# Patient Record
Sex: MALE | Race: OTHER | HISPANIC OR LATINO
[De-identification: names, ages, dates, MRNs, and addresses within clinical notes are randomized per-mention and may not be internally consistent; named-entity substitution may affect disease eponyms.]

---

## 2020-08-26 ENCOUNTER — APPOINTMENT (OUTPATIENT)
Dept: INTERNAL MEDICINE | Facility: CLINIC | Age: 45
End: 2020-08-26

## 2020-08-26 ENCOUNTER — RESULT CHARGE (OUTPATIENT)
Age: 45
End: 2020-08-26

## 2020-08-26 ENCOUNTER — OUTPATIENT (OUTPATIENT)
Dept: OUTPATIENT SERVICES | Facility: HOSPITAL | Age: 45
LOS: 1 days | End: 2020-08-26
Payer: SELF-PAY

## 2020-08-26 VITALS
BODY MASS INDEX: 32.24 KG/M2 | DIASTOLIC BLOOD PRESSURE: 70 MMHG | WEIGHT: 173 LBS | SYSTOLIC BLOOD PRESSURE: 120 MMHG | HEIGHT: 61.5 IN

## 2020-08-26 DIAGNOSIS — B35.3 TINEA PEDIS: ICD-10-CM

## 2020-08-26 DIAGNOSIS — B35.1 TINEA UNGUIUM: ICD-10-CM

## 2020-08-26 DIAGNOSIS — R53.83 OTHER FATIGUE: ICD-10-CM

## 2020-08-26 DIAGNOSIS — Z00.00 ENCOUNTER FOR GENERAL ADULT MEDICAL EXAMINATION WITHOUT ABNORMAL FINDINGS: ICD-10-CM

## 2020-08-26 DIAGNOSIS — E11.42 TYPE 2 DIABETES MELLITUS WITH DIABETIC POLYNEUROPATHY: ICD-10-CM

## 2020-08-26 DIAGNOSIS — E78.1 PURE HYPERGLYCERIDEMIA: ICD-10-CM

## 2020-08-26 DIAGNOSIS — I10 ESSENTIAL (PRIMARY) HYPERTENSION: ICD-10-CM

## 2020-08-26 DIAGNOSIS — E11.9 TYPE 2 DIABETES MELLITUS WITHOUT COMPLICATIONS: ICD-10-CM

## 2020-08-26 DIAGNOSIS — G60.9 HEREDITARY AND IDIOPATHIC NEUROPATHY, UNSPECIFIED: ICD-10-CM

## 2020-08-26 LAB
ALBUMIN: 10
CREATININE: 50
HBA1C MFR BLD HPLC: 7.2
MICROALBUMIN/CREAT UR TEST STR-RTO: NORMAL

## 2020-08-26 PROCEDURE — G0463: CPT

## 2020-08-27 LAB
ALBUMIN SERPL ELPH-MCNC: 4.5 G/DL
ALP BLD-CCNC: 102 U/L
ALT SERPL-CCNC: 16 U/L
ANION GAP SERPL CALC-SCNC: 11 MMOL/L
AST SERPL-CCNC: 18 U/L
BASOPHILS # BLD AUTO: 0.06 K/UL
BASOPHILS NFR BLD AUTO: 0.7 %
BILIRUB SERPL-MCNC: 0.7 MG/DL
BUN SERPL-MCNC: 17 MG/DL
CALCIUM SERPL-MCNC: 9.3 MG/DL
CHLORIDE SERPL-SCNC: 103 MMOL/L
CHOLEST SERPL-MCNC: 113 MG/DL
CHOLEST/HDLC SERPL: 2.7 RATIO
CO2 SERPL-SCNC: 24 MMOL/L
CREAT SERPL-MCNC: 0.5 MG/DL
EOSINOPHIL # BLD AUTO: 0.19 K/UL
EOSINOPHIL NFR BLD AUTO: 2.1 %
ESTIMATED AVERAGE GLUCOSE: 154 MG/DL
FRUCTOSAMINE SERPL-MCNC: 341 UMOL/L
GLUCOSE SERPL-MCNC: 148 MG/DL
HBA1C MFR BLD HPLC: 7 %
HCT VFR BLD CALC: 43.7 %
HDLC SERPL-MCNC: 42 MG/DL
HGB BLD-MCNC: 15.3 G/DL
IMM GRANULOCYTES NFR BLD AUTO: 0.6 %
LDLC SERPL CALC-MCNC: 43 MG/DL
LYMPHOCYTES # BLD AUTO: 1.96 K/UL
LYMPHOCYTES NFR BLD AUTO: 21.9 %
MAN DIFF?: NORMAL
MCHC RBC-ENTMCNC: 31.9 PG
MCHC RBC-ENTMCNC: 35 GM/DL
MCV RBC AUTO: 91 FL
MONOCYTES # BLD AUTO: 0.63 K/UL
MONOCYTES NFR BLD AUTO: 7.1 %
NEUTROPHILS # BLD AUTO: 6.04 K/UL
NEUTROPHILS NFR BLD AUTO: 67.6 %
PLATELET # BLD AUTO: 195 K/UL
POTASSIUM SERPL-SCNC: 4.2 MMOL/L
PROT SERPL-MCNC: 7.3 G/DL
RBC # BLD: 4.8 M/UL
RBC # FLD: 13.2 %
SODIUM SERPL-SCNC: 138 MMOL/L
TRIGL SERPL-MCNC: 139 MG/DL
TSH SERPL-ACNC: 1.1 UIU/ML
VIT B12 SERPL-MCNC: 378 PG/ML
WBC # FLD AUTO: 8.93 K/UL

## 2020-08-27 NOTE — ASSESSMENT
[FreeTextEntry1] : 45M PMH T2DM, hypertriglyceride-induced pancreatitis, presenting for CPE; with symptoms of fatigue, weight gain, and peripheral neuropathy concerning for worsening of untreated T2DM, also with onychomycosis and tinea pedis.\par \par

## 2020-08-27 NOTE — HISTORY OF PRESENT ILLNESS
[FreeTextEntry1] : CPE [de-identified] : 45M PMH T2DM, hypertriglyceride-induced pancreatitis, presenting for CPE.\par \par #Peripheral neuropathy\par - patient reports for past 2 months he has been experiencing burning/tingling and numbness in his legs bilaterally and numbness in his fingertips bilaterally\par - he denies weakness in his lower extremities\par - has never experienced this in the past \par \par #Fatigue\par - patient endorses for past 3 months he has felt excessive fatigue\par - denies chest pain or dyspnea on exertion\par - associated with weight gain ~50 lbs in last 1-2 years, exacerbated by COVID pandemic\par \par #T2DM\par - patient was diagnosed with T2DM when he presented in 2016 to ED feeling unwell, found to have TGs ~ 3700 and found with a1c 11.9\par - was treated in MICU at that time on insulin drip; started on gemfibrozil + statin and d/c'd on humulin BID\par - was steadily taken off of insulin after discharged and transitioned to metformin\par - since last f/u in 2016 he has been off metformin, gemfibrozil, statin. Currently is not taking any medications\par - reports he has gained ~ 50 lb in last 1-2 yrs. Reports his weight was down as low as 120 lbs but then gained weight again in last 1-2 years, but still significantly improved from 2016 when he weighed >200 lbs.\par - has continued to limit carbohydrate intake IE smaller portions of rice. Eats vegetables, chicken, fruits. Only drinks water. Exercises when he can\par - does not check fingersticks at home as he is "afraid." \par - most recent repeat a1c was 5.6 in 2016\par \par #Hypertriglyceride pancreatitis\par - resolved s/p admission for hypertriglyceride pancreatitis, see above\par - repeat TG in 2016 was 137\par - no longer on statin or fibrate

## 2020-08-27 NOTE — REVIEW OF SYSTEMS
[Fever] : no fever [Chills] : no chills [Pain] : no pain [Vision Problems] : no vision problems [Earache] : no earache [Chest Pain] : no chest pain [Lower Ext Edema] : no lower extremity edema [Cough] : no cough [Shortness Of Breath] : no shortness of breath [Dyspnea on Exertion] : not dyspnea on exertion [Abdominal Pain] : no abdominal pain [Constipation] : no constipation [Diarrhea] : no diarrhea [Vomiting] : no vomiting [Dysuria] : no dysuria [Frequency] : no frequency [Itching] : no itching [Joint Pain] : no joint pain [Headache] : no headache [Anxiety] : no anxiety [Easy Bleeding] : no easy bleeding [de-identified] : bilateral upper and lower extremity numbness. Lower extremity burning/tingling

## 2020-08-27 NOTE — PHYSICAL EXAM
[No Acute Distress] : no acute distress [Well Nourished] : well nourished [Well-Appearing] : well-appearing [Well Developed] : well developed [PERRL] : pupils equal round and reactive to light [EOMI] : extraocular movements intact [Normal Sclera/Conjunctiva] : normal sclera/conjunctiva [Normal Oropharynx] : the oropharynx was normal [Normal Outer Ear/Nose] : the outer ears and nose were normal in appearance [No JVD] : no jugular venous distention [Normal TMs] : both tympanic membranes were normal [No Lymphadenopathy] : no lymphadenopathy [Supple] : supple [Thyroid Normal, No Nodules] : the thyroid was normal and there were no nodules present [No Respiratory Distress] : no respiratory distress  [Normal Rate] : normal rate  [Clear to Auscultation] : lungs were clear to auscultation bilaterally [No Accessory Muscle Use] : no accessory muscle use [Regular Rhythm] : with a regular rhythm [Normal S1, S2] : normal S1 and S2 [No Edema] : there was no peripheral edema [Soft] : abdomen soft [Non Tender] : non-tender [Non-distended] : non-distended [No HSM] : no HSM [No Masses] : no abdominal mass palpated [Normal Anterior Cervical Nodes] : no anterior cervical lymphadenopathy [Normal Posterior Cervical Nodes] : no posterior cervical lymphadenopathy [Normal Bowel Sounds] : normal bowel sounds [No CVA Tenderness] : no CVA  tenderness [No Spinal Tenderness] : no spinal tenderness [No Rash] : no rash [No Joint Swelling] : no joint swelling [de-identified] : Bilateral severe onychomycosis and inter-digital and plantar patchy white discoloration. Nofoot ulcers. Sensation to monofilament test was preserved in bilateral feet in all toes and lower plantar surfaces. [de-identified] : reduced sensation in fingertips and down legs. Strength 5/5 in upper and lower extremities bilaterally. Normal gait

## 2020-08-27 NOTE — PLAN
[FreeTextEntry1] : #Peripheral neuropathy\par - I suspect given a1c today 7.2 that his symptoms of burning and numbness in glove/stocking distribution is in setting of poorly controlled T2DM\par - will treat for T2DM as below and see if symptoms improve; if not; we can start low-dose gabapentin at next visit/encounter\par - checking B12 and TSH\par \par #Fatigue\par - likely in setting of uncontrolled T2DM, treat as below\par - will also check TSH\par \par #T2DM\par - a1c now 7.2 up from 5.6 at last visit in 2016\par - patient is above goal, likely 2/2 not being on medications and weight gain\par - will start metformin 500mg qD x 1st week and then BID thereafter\par - starting moderate-dose statin; atorvastatin 20mg, most recent ASCVD 1.9% but can recalculate after today's lab results\par - counseled on dietary and lifestyle modifications to treat diabetes\par - sent glucometer, lancets, and test strips to pharmacy. Instructed patient to measure FS at least daily \par - will have Dr. Shafer call patient for follow up next week 8/31\par - ophtho referral provided\par - podiatry referral provided\par - microalbumin/Cr ratio WNL today; no need for ACE yet\par - recheck blood a1c, checking fructosamine, check lipid panel\par \par #Onychomycosis\par - start urea cream, nail filing, and topical ciclopirox treatment regimen\par - podiatry referral provided\par \par #Tinea pedis\par - start nystatin powder\par - podiatry referral provided\par \par #Hypertriglyceride-pancreatitis\par - checking lipid panel although of note patient was not fasting prior to this visit \par - may need fibrate (prefer fenofibrate) in addition to statin if TGs still persistently elevated\par \par #HCM\par - patient already received Pneumovax in 2015\par - Hep B vaccine series part II today 8/26/20; next and last dose in 6 months\par - check CBC, CMP, a1c, TSH, lipid panel\par - podiatry and ophtho referral provided \par \par RTC in 5 weeks for close follow-up for medication adherence and to check patients FS at home

## 2020-09-30 ENCOUNTER — APPOINTMENT (OUTPATIENT)
Dept: INTERNAL MEDICINE | Facility: CLINIC | Age: 45
End: 2020-09-30

## 2020-09-30 ENCOUNTER — OUTPATIENT (OUTPATIENT)
Dept: OUTPATIENT SERVICES | Facility: HOSPITAL | Age: 45
LOS: 1 days | End: 2020-09-30
Payer: SELF-PAY

## 2020-09-30 VITALS
WEIGHT: 173 LBS | BODY MASS INDEX: 32.24 KG/M2 | HEIGHT: 61.5 IN | SYSTOLIC BLOOD PRESSURE: 120 MMHG | DIASTOLIC BLOOD PRESSURE: 70 MMHG

## 2020-09-30 DIAGNOSIS — I10 ESSENTIAL (PRIMARY) HYPERTENSION: ICD-10-CM

## 2020-09-30 PROCEDURE — G0008: CPT

## 2020-09-30 PROCEDURE — 90688 IIV4 VACCINE SPLT 0.5 ML IM: CPT

## 2020-09-30 PROCEDURE — G0463: CPT | Mod: 25

## 2020-10-02 NOTE — HISTORY OF PRESENT ILLNESS
[FreeTextEntry1] : Diabetes follow up  [de-identified] : 45M PMH T2DM a1c 7, hyperTG pancreatitis, presents for f/u for DM2 management.\par \par #DM2\par - patient compliant w/ metformin 500 BID\par - compliant with atorvastatin\par - compliant with dietary/exercise modification but currently no weight loss \par \par #Diabetic Peripheral neuropathy\par - reports persistent tingling in upper and lower extremities b/l in glove/stocking distribution, not improved.

## 2020-10-02 NOTE — PLAN
[FreeTextEntry1] : #DM2\par - increase metformin to 1000 BID\par - c/w atorvastatin 20mg qHS\par - patient has podiatry and optho f/u scheduled for this year (2020)\par \par #Peripheral neuropathy\par - start gabapentin 100 TID, uptitrate PRN \par - c/w diabetic control \par \par #HCM\par - gave flu shot today 9/30/20\par - RTC In Oct-Dec 2020 for repeat a1c check

## 2020-10-02 NOTE — ASSESSMENT
[FreeTextEntry1] : 45M PMH T2DM a1c 7, hyperTG pancreatitis, presents for f/u for DM2 management, with persistent diabetic peripheral neuropathy.

## 2020-10-02 NOTE — REVIEW OF SYSTEMS
[Fever] : no fever [Discharge] : no discharge [Earache] : no earache [Chest Pain] : no chest pain [Shortness Of Breath] : no shortness of breath [Abdominal Pain] : no abdominal pain [Dysuria] : no dysuria [Joint Pain] : no joint pain [Itching] : no itching [Depression] : no depression [Easy Bleeding] : no easy bleeding [de-identified] : tingling in b/l upper and lower extremities

## 2020-10-07 DIAGNOSIS — Z23 ENCOUNTER FOR IMMUNIZATION: ICD-10-CM

## 2020-10-07 DIAGNOSIS — E11.42 TYPE 2 DIABETES MELLITUS WITH DIABETIC POLYNEUROPATHY: ICD-10-CM

## 2020-10-07 DIAGNOSIS — E11.9 TYPE 2 DIABETES MELLITUS WITHOUT COMPLICATIONS: ICD-10-CM

## 2020-10-14 ENCOUNTER — APPOINTMENT (OUTPATIENT)
Dept: PODIATRY | Facility: HOSPITAL | Age: 45
End: 2020-10-14
Payer: COMMERCIAL

## 2020-10-14 ENCOUNTER — OUTPATIENT (OUTPATIENT)
Dept: OUTPATIENT SERVICES | Facility: HOSPITAL | Age: 45
LOS: 1 days | End: 2020-10-14
Payer: SELF-PAY

## 2020-10-14 VITALS
DIASTOLIC BLOOD PRESSURE: 84 MMHG | TEMPERATURE: 97.7 F | WEIGHT: 175 LBS | HEART RATE: 108 BPM | BODY MASS INDEX: 32.62 KG/M2 | HEIGHT: 61.5 IN | SYSTOLIC BLOOD PRESSURE: 138 MMHG

## 2020-10-14 DIAGNOSIS — M79.609 PAIN IN UNSPECIFIED LIMB: ICD-10-CM

## 2020-10-14 PROCEDURE — 99202 OFFICE O/P NEW SF 15 MIN: CPT

## 2020-10-14 PROCEDURE — G0463: CPT

## 2020-10-14 NOTE — ASSESSMENT
[FreeTextEntry1] : 44 y/o diabetic male pt with onychomycosis and tinea pedis\par - pt seen and evaluated \par - continue nystatin to feet daily\par - pt educated on proper diabetic foot care\par - recommend routine follow up for check up every 6 months

## 2020-10-14 NOTE — HISTORY OF PRESENT ILLNESS
[FreeTextEntry1] : Pt states that he is a diabetic and was referred by primary for diabetic foot eval. He relates history of athletes foot and fungal toenails that he has been using topical medications for. The athletes foot is improving, however there has not been much improvement with the toenails.

## 2020-10-14 NOTE — PHYSICAL EXAM
[2+] : left foot dorsalis pedis 2+ [Skin Color & Pigmentation] : normal skin color and pigmentation [Ankle Swelling (On Exam)] : not present [] : not present [Varicose Veins Of Lower Extremities] : not present [Delayed in the Right Toes] : capillary refills normal in right toes [Delayed in the Left Toes] : capillary refills normal in the left toes [de-identified] : no gross deformities [Foot Ulcer] : no foot ulcer [FreeTextEntry1] : dry scaly rash bilat foot, toenails thickened, dystrophic and mycotic x10 [Vibration Dec.] : normal vibratory sensation at the level of the toes [Diminished Throughout Right Foot] : normal sensation with monofilament testing throughout right foot [Diminished Throughout Left Foot] : normal sensation with monofilament testing throughout left foot

## 2020-10-15 DIAGNOSIS — B35.1 TINEA UNGUIUM: ICD-10-CM

## 2020-10-15 DIAGNOSIS — B35.3 TINEA PEDIS: ICD-10-CM

## 2020-10-15 DIAGNOSIS — E11.42 TYPE 2 DIABETES MELLITUS WITH DIABETIC POLYNEUROPATHY: ICD-10-CM

## 2020-11-12 ENCOUNTER — APPOINTMENT (OUTPATIENT)
Dept: OPHTHALMOLOGY | Facility: CLINIC | Age: 45
End: 2020-11-12

## 2020-12-09 ENCOUNTER — APPOINTMENT (OUTPATIENT)
Dept: INTERNAL MEDICINE | Facility: CLINIC | Age: 45
End: 2020-12-09

## 2020-12-09 ENCOUNTER — RESULT CHARGE (OUTPATIENT)
Age: 45
End: 2020-12-09

## 2020-12-09 ENCOUNTER — OUTPATIENT (OUTPATIENT)
Dept: OUTPATIENT SERVICES | Facility: HOSPITAL | Age: 45
LOS: 1 days | End: 2020-12-09
Payer: SELF-PAY

## 2020-12-09 VITALS
WEIGHT: 170 LBS | HEIGHT: 61.5 IN | BODY MASS INDEX: 31.69 KG/M2 | HEART RATE: 93 BPM | OXYGEN SATURATION: 98 % | DIASTOLIC BLOOD PRESSURE: 80 MMHG | SYSTOLIC BLOOD PRESSURE: 110 MMHG

## 2020-12-09 DIAGNOSIS — I10 ESSENTIAL (PRIMARY) HYPERTENSION: ICD-10-CM

## 2020-12-09 DIAGNOSIS — N52.9 MALE ERECTILE DYSFUNCTION, UNSPECIFIED: ICD-10-CM

## 2020-12-09 PROCEDURE — G0463: CPT

## 2020-12-09 NOTE — HISTORY OF PRESENT ILLNESS
[FreeTextEntry1] : Diabetes follow-up [de-identified] : 45M PMH T2DM a1c 7 c/b peripheral neuropathy, hypertriglyceride pancreatitis now resolved, here for f/u DM2 management.\par \par #DM2 c/b peripheral neuropathy\par - at last apt a1c 7 Sept 2020\par - was started on metformin titrated up to 1000 BID and atorvastatin 20mg qHS at last apt\par - saw podiatry since last exam; treated for tinea pedis and onychomycosis; will f/u q 6 months \par - did not see ophthalmology as he missed his appointment\par - started on gabapentin 100 TID at last visit; did not help his peripheral neuropathic symptoms (distal hand and feet tingling/numbness/pressure worse at night, disturbing sleep)\par - patient did not  his atorvastatin \par \par #Erectile dysfunction\par - patient complains of inability to sustain erection consistently\par - denies marital problems

## 2020-12-09 NOTE — PLAN
[FreeTextEntry1] : #DM2 c/b peripheral neuropathy\par - a1c significantly improved at this visit; currently 5.1 (down from 7 Sept 2020)\par - c/w dietary and lifestyle modifications; patient lost 5 lbs since last visit\par - c/w metformin 1000 BID\par - increased gabapentin from 100 TID to 300 TID given persistent diabetic peripheral neuropathy\par - reordered atorvastatin 20 qHS as patient did not  this medication; reinforced the importance of statin in setting of diabetes\par - podiatry f/u q 6months  \par - ophtho f/u q yr: patient missed last appointment so provided referral for him to go back\par \par #tinea pedis and onychomycosis of foot\par - c/w urease cream and ciclopirox, c/w nystatin powder\par \par #HCM\par - received flu Sept 2020\par - received Tdap and Pneumovax 2015\par \par RTC for next CPE or sooner PRN

## 2020-12-09 NOTE — ASSESSMENT
[FreeTextEntry1] : 45M PMH T2DM a1c 7 c/b peripheral neuropathy, hypertriglyceride pancreatitis now resolved, here for f/u DM2 management; with persistent peripheral neuropathy on low dose gabapentin and with new erectile dysfunction.\par \par

## 2020-12-09 NOTE — REVIEW OF SYSTEMS
[Fever] : no fever [Discharge] : no discharge [Earache] : no earache [Chest Pain] : no chest pain [Shortness Of Breath] : no shortness of breath [Abdominal Pain] : no abdominal pain [Dysuria] : no dysuria [Joint Pain] : no joint pain [Itching] : no itching [Headache] : no headache [Suicidal] : not suicidal [Easy Bleeding] : no easy bleeding [de-identified] : hand and foot numbness and tingling

## 2020-12-10 LAB — HBA1C MFR BLD HPLC: 5.1

## 2020-12-11 DIAGNOSIS — E11.9 TYPE 2 DIABETES MELLITUS WITHOUT COMPLICATIONS: ICD-10-CM

## 2020-12-11 DIAGNOSIS — E11.42 TYPE 2 DIABETES MELLITUS WITH DIABETIC POLYNEUROPATHY: ICD-10-CM

## 2020-12-11 DIAGNOSIS — N52.9 MALE ERECTILE DYSFUNCTION, UNSPECIFIED: ICD-10-CM

## 2021-02-24 ENCOUNTER — APPOINTMENT (OUTPATIENT)
Dept: INTERNAL MEDICINE | Facility: CLINIC | Age: 46
End: 2021-02-24
Payer: SELF-PAY

## 2021-02-24 ENCOUNTER — OUTPATIENT (OUTPATIENT)
Dept: OUTPATIENT SERVICES | Facility: HOSPITAL | Age: 46
LOS: 1 days | End: 2021-02-24
Payer: SELF-PAY

## 2021-02-24 DIAGNOSIS — E11.42 TYPE 2 DIABETES MELLITUS WITH DIABETIC POLYNEUROPATHY: ICD-10-CM

## 2021-02-24 DIAGNOSIS — E11.9 TYPE 2 DIABETES MELLITUS WITHOUT COMPLICATIONS: ICD-10-CM

## 2021-02-24 DIAGNOSIS — Z22.1 CARRIER OF OTHER INTESTINAL INFECTIOUS DISEASES: ICD-10-CM

## 2021-02-24 DIAGNOSIS — N52.9 MALE ERECTILE DYSFUNCTION, UNSPECIFIED: ICD-10-CM

## 2021-02-24 PROCEDURE — ZZZZZ: CPT

## 2021-02-24 PROCEDURE — 90471 IMMUNIZATION ADMIN: CPT

## 2021-02-24 PROCEDURE — 90636 HEP A/HEP B VACC ADULT IM: CPT

## 2021-04-26 ENCOUNTER — RX RENEWAL (OUTPATIENT)
Age: 46
End: 2021-04-26

## 2021-08-18 ENCOUNTER — RX RENEWAL (OUTPATIENT)
Age: 46
End: 2021-08-18

## 2021-09-21 ENCOUNTER — LABORATORY RESULT (OUTPATIENT)
Age: 46
End: 2021-09-21

## 2021-09-21 ENCOUNTER — APPOINTMENT (OUTPATIENT)
Dept: INTERNAL MEDICINE | Facility: CLINIC | Age: 46
End: 2021-09-21
Payer: COMMERCIAL

## 2021-09-21 ENCOUNTER — NON-APPOINTMENT (OUTPATIENT)
Age: 46
End: 2021-09-21

## 2021-09-21 ENCOUNTER — OUTPATIENT (OUTPATIENT)
Dept: OUTPATIENT SERVICES | Facility: HOSPITAL | Age: 46
LOS: 1 days | End: 2021-09-21
Payer: SELF-PAY

## 2021-09-21 VITALS
BODY MASS INDEX: 34.23 KG/M2 | HEART RATE: 92 BPM | OXYGEN SATURATION: 98 % | SYSTOLIC BLOOD PRESSURE: 120 MMHG | HEIGHT: 62 IN | WEIGHT: 186 LBS | DIASTOLIC BLOOD PRESSURE: 84 MMHG

## 2021-09-21 DIAGNOSIS — E11.9 TYPE 2 DIABETES MELLITUS WITHOUT COMPLICATIONS: ICD-10-CM

## 2021-09-21 DIAGNOSIS — E78.5 HYPERLIPIDEMIA, UNSPECIFIED: ICD-10-CM

## 2021-09-21 DIAGNOSIS — I10 ESSENTIAL (PRIMARY) HYPERTENSION: ICD-10-CM

## 2021-09-21 DIAGNOSIS — E11.42 TYPE 2 DIABETES MELLITUS WITH DIABETIC POLYNEUROPATHY: ICD-10-CM

## 2021-09-21 PROCEDURE — 80053 COMPREHEN METABOLIC PANEL: CPT

## 2021-09-21 PROCEDURE — G0463: CPT

## 2021-09-21 PROCEDURE — 82043 UR ALBUMIN QUANTITATIVE: CPT

## 2021-09-21 PROCEDURE — 83036 HEMOGLOBIN GLYCOSYLATED A1C: CPT

## 2021-09-21 PROCEDURE — 36415 COLL VENOUS BLD VENIPUNCTURE: CPT

## 2021-09-21 PROCEDURE — ZZZZZ: CPT

## 2021-09-22 ENCOUNTER — NON-APPOINTMENT (OUTPATIENT)
Age: 46
End: 2021-09-22

## 2021-09-22 LAB
A1C WITH ESTIMATED AVERAGE GLUCOSE RESULT: 8 % — HIGH (ref 4–5.6)
ALBUMIN SERPL ELPH-MCNC: 4.7 G/DL — SIGNIFICANT CHANGE UP (ref 3.3–5)
ALP SERPL-CCNC: 131 U/L — HIGH (ref 40–120)
ALT FLD-CCNC: 45 U/L — SIGNIFICANT CHANGE UP (ref 10–45)
ANION GAP SERPL CALC-SCNC: 13 MMOL/L — SIGNIFICANT CHANGE UP (ref 5–17)
AST SERPL-CCNC: 30 U/L — SIGNIFICANT CHANGE UP (ref 10–40)
BILIRUB SERPL-MCNC: 0.6 MG/DL — SIGNIFICANT CHANGE UP (ref 0.2–1.2)
BUN SERPL-MCNC: 17 MG/DL — SIGNIFICANT CHANGE UP (ref 7–23)
CALCIUM SERPL-MCNC: 10 MG/DL — SIGNIFICANT CHANGE UP (ref 8.4–10.5)
CHLORIDE SERPL-SCNC: 100 MMOL/L — SIGNIFICANT CHANGE UP (ref 96–108)
CO2 SERPL-SCNC: 25 MMOL/L — SIGNIFICANT CHANGE UP (ref 22–31)
CREAT ?TM UR-MCNC: 114 MG/DL — SIGNIFICANT CHANGE UP
CREAT SERPL-MCNC: 0.59 MG/DL — SIGNIFICANT CHANGE UP (ref 0.5–1.3)
ESTIMATED AVERAGE GLUCOSE: 183 MG/DL — HIGH (ref 68–114)
GLUCOSE SERPL-MCNC: 262 MG/DL — HIGH (ref 70–99)
MICROALBUMIN UR-MCNC: 1.2 MG/DL — SIGNIFICANT CHANGE UP
MICROALBUMIN/CREAT UR-RTO: SIGNIFICANT CHANGE UP MG/G (ref 0–30)
POTASSIUM SERPL-MCNC: 4.4 MMOL/L — SIGNIFICANT CHANGE UP (ref 3.5–5.3)
POTASSIUM SERPL-SCNC: 4.4 MMOL/L — SIGNIFICANT CHANGE UP (ref 3.5–5.3)
PROT SERPL-MCNC: 7.8 G/DL — SIGNIFICANT CHANGE UP (ref 6–8.3)
SODIUM SERPL-SCNC: 138 MMOL/L — SIGNIFICANT CHANGE UP (ref 135–145)

## 2021-09-22 NOTE — ASSESSMENT
[FreeTextEntry1] : Mr. Guerra is a 47 y/o M with pmhx of T2DM with peripheral neuropathy who presents for diabetes checkup. Last HgbA1c 5.1 in Dec 2020.

## 2021-09-22 NOTE — HISTORY OF PRESENT ILLNESS
[FreeTextEntry1] : Diabetes Check Up  [de-identified] : Mr. Guerra is a 47 y/o with T2DM with peripheral neuropathy ho presents for diabetes checkup. He  states that he is complaint with medications; takes metformin 1000 mg BID. He does not check his sugars at home. Last time he checked was 7 months ago and he states that his sugars were in the 120-140 range. He states that has working glucometer and lancets at home. He denies ant feelings of dizziness, headaches, or LOC. He saw the podiatrist 4-5 months ago. He has not seen opthalmology but denies any vision changes or blurriness.

## 2021-09-22 NOTE — PLAN
[FreeTextEntry1] : #Diabetes \par - c/w metformin 1000mg BID\par - patient encouraged to check fingersticks 2x a day in AM and PM to assess glucose status\par - denies any sxs of hypoglycemia (dizziness, fatigue, headaches)\par - plan to repeat HgbA1c today, CMP, Microalbumin/Cr ratio \par - optho referral given \par - states that he saw podiatry 5 months ago for followup, plan for yearly check up \par \par #Peripheral Neuropathy\par - mainly in hands, denies any sxs in feet \par - c/w gabapentin 300mg BID \par - Diabetes foot exam normal, repeat in 1 year at next CPE\par \par #HCM\par Patient planning to get COVID vaccine with the next weeks, will follow up in 2 months for CPE \par - plan for flu shot at next visit \par - f/u in 2-3 months for CPE

## 2021-09-24 RX ORDER — SITAGLIPTIN 25 MG/1
25 TABLET, FILM COATED ORAL DAILY
Qty: 1 | Refills: 2 | Status: DISCONTINUED | COMMUNITY
Start: 2021-09-24 | End: 2021-09-24

## 2021-11-30 ENCOUNTER — NON-APPOINTMENT (OUTPATIENT)
Age: 46
End: 2021-11-30

## 2021-11-30 ENCOUNTER — OUTPATIENT (OUTPATIENT)
Dept: OUTPATIENT SERVICES | Facility: HOSPITAL | Age: 46
LOS: 1 days | End: 2021-11-30
Payer: SELF-PAY

## 2021-11-30 ENCOUNTER — APPOINTMENT (OUTPATIENT)
Dept: INTERNAL MEDICINE | Facility: CLINIC | Age: 46
End: 2021-11-30

## 2021-11-30 VITALS — DIASTOLIC BLOOD PRESSURE: 70 MMHG | TEMPERATURE: 98.5 F | SYSTOLIC BLOOD PRESSURE: 110 MMHG

## 2021-11-30 VITALS — WEIGHT: 178 LBS | HEIGHT: 62 IN | BODY MASS INDEX: 32.76 KG/M2

## 2021-11-30 DIAGNOSIS — I10 ESSENTIAL (PRIMARY) HYPERTENSION: ICD-10-CM

## 2021-11-30 PROCEDURE — G0463: CPT

## 2021-11-30 NOTE — PHYSICAL EXAM
[Comprehensive Foot Exam Normal] : Right and left foot were examined and both feet are normal. No ulcers in either foot. Toes are normal and with full ROM.  Normal tactile sensation with monofilament testing throughout both feet [Normal] : affect was normal and insight and judgment were intact

## 2021-12-06 DIAGNOSIS — E11.42 TYPE 2 DIABETES MELLITUS WITH DIABETIC POLYNEUROPATHY: ICD-10-CM

## 2021-12-06 DIAGNOSIS — E11.9 TYPE 2 DIABETES MELLITUS WITHOUT COMPLICATIONS: ICD-10-CM

## 2021-12-06 DIAGNOSIS — B35.1 TINEA UNGUIUM: ICD-10-CM

## 2022-01-05 NOTE — PLAN
[FreeTextEntry1] : #Diabetes \par - c/w metformin 1000mg BID, \par - wanted to start patient on SGLT2 inhibitors of GLP1 agonist, but too expensive for patient on sliding scale\par - patient opted of improved lifestyle modifications, and has lost over 9 lbs since previous visit \par - patient encouraged to check fingersticks 2x a day in AM and PM to assess glucose status\par - hasn’t  seen optho yet, optho referral given again\par - UTD with podiatry this year; \par \par #Peripheral Neuropathy\par - c/w gabapentin 300mg BID \par - Diabetes foot exam normal \par \par #HCM\par - patient still hesitant about COVID vaccine, spent over 15 minutes discussing importance of vaccine especially with new omicron variant; patient will think it over and decide\par - Flu shot given \par - RTC in 3 months for Diabetes check up

## 2022-01-05 NOTE — HISTORY OF PRESENT ILLNESS
[FreeTextEntry1] : CPE [de-identified] : Mr. Guerra is a 45 y/o with T2DM with peripheral neuropathy who presents for CPE. Patient has improved his diet and has increased physical activity. He states that the has walked for 30 mins everyday. He as decreased carbs in his diet and changed to whole grains and vegetables. he states that he  feels lighter and more agile.   He  states that he is complaint with medications; takes metformin 1000 mg BID. He has been chencking his sugars at [facility] at states that they range from 90-100s. he denies any BS levels above 200 or below 60. Denies any hypoglycemic sxs. \par \par Patient has not yet received his COVID vaccine and is till hesitant to receive it. He states that most of his family members have received  that vaccine. he is conflicted because of all the varying information he is seeing online. He is considering it now because of the new variant and his comorbidities.

## 2022-01-05 NOTE — HEALTH RISK ASSESSMENT
[Excellent] : ~his/her~  mood as  excellent [No] : No [No falls in past year] : Patient reported no falls in the past year [0] : 2) Feeling down, depressed, or hopeless: Not at all (0) [PHQ-2 Negative - No further assessment needed] : PHQ-2 Negative - No further assessment needed [None] : None [With Family] : lives with family [Employed] : employed [] :  [Sexually Active] : sexually active [Feels Safe at Home] : Feels safe at home [Fully functional (bathing, dressing, toileting, transferring, walking, feeding)] : Fully functional (bathing, dressing, toileting, transferring, walking, feeding) [Fully functional (using the telephone, shopping, preparing meals, housekeeping, doing laundry, using] : Fully functional and needs no help or supervision to perform IADLs (using the telephone, shopping, preparing meals, housekeeping, doing laundry, using transportation, managing medications and managing finances) [] : No [FreeTextEntry2] : Works in [employer]

## 2022-01-05 NOTE — DISCUSSION/SUMMARY
[FreeTextEntry1] : Attempted to call patient in regards to elevated HgbA1c to 8%, Left VM discussing results. Patient compliant with metformin 1g BID. Will start on Januvia 25mg qd for better glycemic control.\par \par Addendum:\par Patient on sliding scale and cannot afford Januvia as it is too expensive. Discussed potentially starting glipizide but patient wants to increased exercise and improve diet first. \par \par Will followup with me in November 30th and obtain repeat labs. If HgbA1c worsening, will start on glipizide 2.5mg before meals. Patient also educated on importance of check BS in AM and PM. Told to call clinic if sugars are elevated over 200 and may need to start glipizide/glimepride earlier. \par \par Patient expressed understanding

## 2022-03-15 ENCOUNTER — APPOINTMENT (OUTPATIENT)
Dept: INTERNAL MEDICINE | Facility: CLINIC | Age: 47
End: 2022-03-15

## 2022-06-15 ENCOUNTER — APPOINTMENT (OUTPATIENT)
Dept: INTERNAL MEDICINE | Facility: CLINIC | Age: 47
End: 2022-06-15

## 2023-01-18 ENCOUNTER — APPOINTMENT (OUTPATIENT)
Dept: INTERNAL MEDICINE | Facility: CLINIC | Age: 48
End: 2023-01-18
Payer: COMMERCIAL

## 2023-01-18 ENCOUNTER — OUTPATIENT (OUTPATIENT)
Dept: OUTPATIENT SERVICES | Facility: HOSPITAL | Age: 48
LOS: 1 days | End: 2023-01-18
Payer: SELF-PAY

## 2023-01-18 DIAGNOSIS — Z87.898 PERSONAL HISTORY OF OTHER SPECIFIED CONDITIONS: ICD-10-CM

## 2023-01-18 DIAGNOSIS — R63.1 POLYDIPSIA: ICD-10-CM

## 2023-01-18 DIAGNOSIS — Z91.018 ALLERGY TO OTHER FOODS: ICD-10-CM

## 2023-01-18 DIAGNOSIS — E78.1 PURE HYPERGLYCERIDEMIA: ICD-10-CM

## 2023-01-18 DIAGNOSIS — G60.9 HEREDITARY AND IDIOPATHIC NEUROPATHY, UNSPECIFIED: ICD-10-CM

## 2023-01-18 DIAGNOSIS — B35.1 TINEA UNGUIUM: ICD-10-CM

## 2023-01-18 DIAGNOSIS — Z83.3 FAMILY HISTORY OF DIABETES MELLITUS: ICD-10-CM

## 2023-01-18 DIAGNOSIS — I10 ESSENTIAL (PRIMARY) HYPERTENSION: ICD-10-CM

## 2023-01-18 DIAGNOSIS — B35.3 TINEA PEDIS: ICD-10-CM

## 2023-01-18 DIAGNOSIS — R35.81 NOCTURNAL POLYURIA: ICD-10-CM

## 2023-01-18 PROCEDURE — 84154 ASSAY OF PSA FREE: CPT

## 2023-01-18 PROCEDURE — 82948 REAGENT STRIP/BLOOD GLUCOSE: CPT

## 2023-01-18 PROCEDURE — ZZZZZ: CPT

## 2023-01-18 PROCEDURE — 81003 URINALYSIS AUTO W/O SCOPE: CPT

## 2023-01-18 PROCEDURE — 85025 COMPLETE CBC W/AUTO DIFF WBC: CPT

## 2023-01-18 PROCEDURE — 80061 LIPID PANEL: CPT

## 2023-01-18 PROCEDURE — 80053 COMPREHEN METABOLIC PANEL: CPT

## 2023-01-18 PROCEDURE — 81001 URINALYSIS AUTO W/SCOPE: CPT

## 2023-01-18 PROCEDURE — 82306 VITAMIN D 25 HYDROXY: CPT

## 2023-01-18 PROCEDURE — G0463: CPT | Mod: 25

## 2023-01-18 PROCEDURE — 82043 UR ALBUMIN QUANTITATIVE: CPT

## 2023-01-18 PROCEDURE — 86803 HEPATITIS C AB TEST: CPT

## 2023-01-18 PROCEDURE — 83036 HEMOGLOBIN GLYCOSYLATED A1C: CPT

## 2023-01-18 RX ORDER — CICLOPIROX 80 MG/ML
8 SOLUTION TOPICAL
Qty: 1 | Refills: 0 | Status: DISCONTINUED | COMMUNITY
Start: 2020-08-26 | End: 2023-01-18

## 2023-01-18 RX ORDER — UREA 40 G/100G
40 CREAM TOPICAL DAILY
Qty: 1 | Refills: 2 | Status: DISCONTINUED | COMMUNITY
Start: 2020-08-26 | End: 2023-01-18

## 2023-01-18 RX ORDER — NYSTATIN 100000 1/G
100000 POWDER TOPICAL
Qty: 1 | Refills: 2 | Status: DISCONTINUED | COMMUNITY
Start: 2020-08-26 | End: 2023-01-18

## 2023-01-18 RX ORDER — BLOOD SUGAR DIAGNOSTIC
STRIP MISCELLANEOUS
Qty: 1 | Refills: 0 | Status: DISCONTINUED | COMMUNITY
Start: 2020-08-26 | End: 2023-01-18

## 2023-01-18 RX ORDER — LANCETS 28 GAUGE
EACH MISCELLANEOUS
Qty: 1 | Refills: 2 | Status: DISCONTINUED | COMMUNITY
Start: 2020-08-26 | End: 2023-01-18

## 2023-01-18 RX ORDER — ATORVASTATIN CALCIUM 20 MG/1
20 TABLET, FILM COATED ORAL
Qty: 60 | Refills: 2 | Status: DISCONTINUED | COMMUNITY
Start: 2020-08-26 | End: 2023-01-18

## 2023-01-18 RX ORDER — SILDENAFIL 25 MG/1
25 TABLET ORAL
Qty: 30 | Refills: 3 | Status: DISCONTINUED | COMMUNITY
Start: 2020-12-09 | End: 2023-01-18

## 2023-01-19 PROBLEM — R35.81 NOCTURNAL POLYURIA: Status: ACTIVE | Noted: 2023-01-19

## 2023-01-19 PROBLEM — Z87.898 HISTORY OF FATIGUE: Status: RESOLVED | Noted: 2020-08-26 | Resolved: 2023-01-19

## 2023-01-19 PROBLEM — B35.3 TINEA PEDIS OF BOTH FEET: Status: RESOLVED | Noted: 2020-08-26 | Resolved: 2023-01-19

## 2023-01-19 PROBLEM — G60.9 IDIOPATHIC PERIPHERAL NEUROPATHY: Status: RESOLVED | Noted: 2020-08-26 | Resolved: 2023-01-19

## 2023-01-19 LAB
ALBUMIN: NORMAL
CREATININE: NORMAL
GLUCOSE BLDC GLUCOMTR-MCNC: 306
HBA1C MFR BLD HPLC: >14
MICROALBUMIN/CREAT UR TEST STR-RTO: NORMAL

## 2023-01-20 VITALS — DIASTOLIC BLOOD PRESSURE: 78 MMHG | SYSTOLIC BLOOD PRESSURE: 124 MMHG | HEART RATE: 97 BPM

## 2023-01-20 PROBLEM — B35.1 ONYCHOMYCOSIS OF TOENAIL: Status: ACTIVE | Noted: 2020-08-26

## 2023-01-20 LAB
25(OH)D3 SERPL-MCNC: 18.4 NG/ML
ALBUMIN SERPL ELPH-MCNC: 4.4 G/DL
ALP BLD-CCNC: 157 U/L
ALT SERPL-CCNC: 45 U/L
ANION GAP SERPL CALC-SCNC: 13 MMOL/L
APPEARANCE: CLEAR
AST SERPL-CCNC: 30 U/L
BACTERIA: NEGATIVE
BASOPHILS # BLD AUTO: 0.07 K/UL
BASOPHILS NFR BLD AUTO: 1 %
BILIRUB SERPL-MCNC: 0.8 MG/DL
BILIRUBIN URINE: NEGATIVE
BLOOD URINE: NEGATIVE
BUN SERPL-MCNC: 13 MG/DL
CALCIUM SERPL-MCNC: 10 MG/DL
CHLORIDE SERPL-SCNC: 99 MMOL/L
CHOLEST SERPL-MCNC: 133 MG/DL
CO2 SERPL-SCNC: 23 MMOL/L
COLOR: YELLOW
CREAT SERPL-MCNC: 0.5 MG/DL
CREAT SPEC-SCNC: 143 MG/DL
EGFR: 128 ML/MIN/1.73M2
ENA SS-A AB SER IA-ACNC: <0.2 AL
ENA SS-B AB SER IA-ACNC: <0.2 AL
EOSINOPHIL # BLD AUTO: 0.38 K/UL
EOSINOPHIL NFR BLD AUTO: 5.3 %
GLUCOSE QUALITATIVE U: ABNORMAL
GLUCOSE SERPL-MCNC: 325 MG/DL
HCT VFR BLD CALC: 49.6 %
HCV AB SER QL: NONREACTIVE
HCV S/CO RATIO: 0.08 S/CO
HDLC SERPL-MCNC: 40 MG/DL
HGB BLD-MCNC: 17.3 G/DL
HYALINE CASTS: 0 /LPF
IMM GRANULOCYTES NFR BLD AUTO: 0.7 %
KETONES URINE: NEGATIVE
LDLC SERPL CALC-MCNC: 68 MG/DL
LEUKOCYTE ESTERASE URINE: NEGATIVE
LYMPHOCYTES # BLD AUTO: 1.99 K/UL
LYMPHOCYTES NFR BLD AUTO: 27.8 %
MAN DIFF?: NORMAL
MCHC RBC-ENTMCNC: 32 PG
MCHC RBC-ENTMCNC: 34.9 GM/DL
MCV RBC AUTO: 91.7 FL
MICROALBUMIN 24H UR DL<=1MG/L-MCNC: 1.9 MG/DL
MICROALBUMIN/CREAT 24H UR-RTO: 13 MG/G
MICROSCOPIC-UA: NORMAL
MONOCYTES # BLD AUTO: 0.44 K/UL
MONOCYTES NFR BLD AUTO: 6.1 %
NEUTROPHILS # BLD AUTO: 4.23 K/UL
NEUTROPHILS NFR BLD AUTO: 59.1 %
NITRITE URINE: NEGATIVE
NONHDLC SERPL-MCNC: 93 MG/DL
PH URINE: 5.5
PLATELET # BLD AUTO: 193 K/UL
POTASSIUM SERPL-SCNC: 4.4 MMOL/L
PROT SERPL-MCNC: 7.8 G/DL
PROTEIN URINE: ABNORMAL
PSA FREE FLD-MCNC: 33 %
PSA FREE SERPL-MCNC: 0.15 NG/ML
PSA SERPL-MCNC: 0.45 NG/ML
RBC # BLD: 5.41 M/UL
RBC # FLD: 14.8 %
RED BLOOD CELLS URINE: 3 /HPF
SODIUM SERPL-SCNC: 135 MMOL/L
SPECIFIC GRAVITY URINE: 1.05
SQUAMOUS EPITHELIAL CELLS: 1 /HPF
TRIGL SERPL-MCNC: 126 MG/DL
UROBILINOGEN URINE: ABNORMAL
WBC # FLD AUTO: 7.16 K/UL
WHITE BLOOD CELLS URINE: 4 /HPF

## 2023-01-24 ENCOUNTER — APPOINTMENT (OUTPATIENT)
Dept: INTERNAL MEDICINE | Facility: CLINIC | Age: 48
End: 2023-01-24
Payer: COMMERCIAL

## 2023-01-24 ENCOUNTER — OUTPATIENT (OUTPATIENT)
Dept: OUTPATIENT SERVICES | Facility: HOSPITAL | Age: 48
LOS: 1 days | End: 2023-01-24
Payer: SELF-PAY

## 2023-01-24 VITALS
HEART RATE: 95 BPM | SYSTOLIC BLOOD PRESSURE: 102 MMHG | WEIGHT: 180 LBS | HEIGHT: 62 IN | BODY MASS INDEX: 33.13 KG/M2 | OXYGEN SATURATION: 97 % | DIASTOLIC BLOOD PRESSURE: 78 MMHG

## 2023-01-24 DIAGNOSIS — I10 ESSENTIAL (PRIMARY) HYPERTENSION: ICD-10-CM

## 2023-01-24 PROCEDURE — G0463: CPT | Mod: 25

## 2023-01-24 PROCEDURE — 82948 REAGENT STRIP/BLOOD GLUCOSE: CPT

## 2023-01-24 PROCEDURE — ZZZZZ: CPT | Mod: GC

## 2023-01-24 PROCEDURE — T1013: CPT

## 2023-01-24 RX ORDER — BLOOD-GLUCOSE METER
W/DEVICE KIT MISCELLANEOUS
Qty: 1 | Refills: 0 | Status: ACTIVE | COMMUNITY
Start: 2023-01-24 | End: 1900-01-01

## 2023-01-24 NOTE — INTERPRETER SERVICES
[Patient Declined  Services] : - None: Patient declined  services [Time Spent: ____ minutes] : Total time spent using  services: [unfilled] minutes. The patient's primary language is not English thus required  services.

## 2023-01-25 LAB — GLUCOSE BLDC GLUCOMTR-MCNC: NORMAL

## 2023-01-25 RX ORDER — LANCETS 33 GAUGE
EACH MISCELLANEOUS
Qty: 50 | Refills: 0 | Status: ACTIVE | COMMUNITY
Start: 2023-01-25 | End: 1900-01-01

## 2023-01-25 NOTE — HISTORY OF PRESENT ILLNESS
[FreeTextEntry1] : Follow-up for Diabetes Management [de-identified] : Mr. Guerra is a 47-yo with PMHx of poorly controlled T2DM c/b DKA and peripheral neuropathy, hypertriglyceridemic pancreatitis in 2016, who presents for follow-up for diabetes management after he presented 1 week ago for CPE and was found to have an increase in his a1c from 8%->14% from his last visit. \par \par #T2DM\par - Last POCT a1c in 1/18/2023 was 14%, from 8% prior. Labs at the time notable for UA w/ 1000+ glucose, CMP with Glucose 325, no elevated anion gap, normal bicarbonate. Patient was supposed to be taking Metformin 1000 BID at the time. \par - FS this visit 335. Informs me that he feels much better and that his symptoms (polyuria, polydipsia) at last visit were due to him not taking metformin for 1-2 months at the time, restarted 1 week ago. Did not take it as he had trouble getting them from [facility]. Feels thirstiness is "normal" for him now, urinates 1 time at night (rather than 3-4). Denies stomach pain, nausea, confusion.\par - No numbness or tingling, taking gabapentin. \par - Was given an endo referral at last visit - has not yet scheduled but informs me that he will soon.   \par - Was given podiatry and optho referral at last visit - understands that he needs to see the foot and eye doctor - informs me he will schedule\par \par #Hypertriglyceridemia\par - Last Triglyceride 126, LDL  in 1/2023 eyeglasses

## 2023-01-25 NOTE — PHYSICAL EXAM
[No Acute Distress] : no acute distress [Well Nourished] : well nourished [Well Developed] : well developed [Well-Appearing] : well-appearing [No Respiratory Distress] : no respiratory distress  [No Accessory Muscle Use] : no accessory muscle use [Clear to Auscultation] : lungs were clear to auscultation bilaterally [Normal] : no respiratory distress, lungs were clear to auscultation bilaterally and no accessory muscle use [No Skin Lesions] : no skin lesions [Normal Sclera/Conjunctiva] : normal sclera/conjunctiva [EOMI] : extraocular movements intact [Normal Outer Ear/Nose] : the outer ears and nose were normal in appearance [Supple] : supple [Normal Rate] : normal rate  [Regular Rhythm] : with a regular rhythm [Normal S1, S2] : normal S1 and S2 [Soft] : abdomen soft [Non Tender] : non-tender [No CVA Tenderness] : no CVA  tenderness [Coordination Grossly Intact] : coordination grossly intact [No Focal Deficits] : no focal deficits [Speech Grossly Normal] : speech grossly normal [Normal Affect] : the affect was normal [de-identified] : No lesions on feet bilaterally.  [de-identified] : Monofilament exam wnl, no sensory deficits in feet bilaterally.

## 2023-01-25 NOTE — REVIEW OF SYSTEMS
[Negative] : Genitourinary [Abdominal Pain] : no abdominal pain [Nausea] : no nausea [Nocturia] : no nocturia [Frequency] : no frequency [FreeTextEntry8] : No excessive thirstiness

## 2023-01-26 DIAGNOSIS — E78.5 HYPERLIPIDEMIA, UNSPECIFIED: ICD-10-CM

## 2023-01-26 DIAGNOSIS — E11.9 TYPE 2 DIABETES MELLITUS WITHOUT COMPLICATIONS: ICD-10-CM

## 2023-01-26 DIAGNOSIS — E11.42 TYPE 2 DIABETES MELLITUS WITH DIABETIC POLYNEUROPATHY: ICD-10-CM

## 2023-01-27 NOTE — HISTORY OF PRESENT ILLNESS
[FreeTextEntry1] : CPE [de-identified] : 47M with T2DM with peripheral neuropathy, HLD who presents for CPE. \par \par Patient reports that he has been feeling tired for the last 3 weeks. over the last weeks, he wakes up 3-4 times a night to urinate. No pain or discomfort. has been drinking more water these days, feels thirsty, estimates that he drinks five 16 oz bottles of water daily. no dysuria, no f/c. denies dribbling of urine, no incontinence, no weak urine stream. no hematuria. no abdominal pain, no n/v. no appetite changes. no weight loss, thinks he may have gained a few pounds.

## 2023-01-27 NOTE — HEALTH RISK ASSESSMENT
[Very Good] : ~his/her~ current health as very good [Excellent] : ~his/her~  mood as  excellent [Never] : Never [No] : No [Yes] : In the past 12 months have you used drugs other than those required for medical reasons? Yes [0] : 2) Feeling down, depressed, or hopeless: Not at all (0) [PHQ-2 Negative - No further assessment needed] : PHQ-2 Negative - No further assessment needed [With Family] : lives with family [# of Members in Household ___] :  household currently consist of [unfilled] member(s) [Employed] : employed [High School] : high school [Single] : single [Sexually Active] : sexually active [Feels Safe at Home] : Feels safe at home [Fully functional (bathing, dressing, toileting, transferring, walking, feeding)] : Fully functional (bathing, dressing, toileting, transferring, walking, feeding) [Fully functional (using the telephone, shopping, preparing meals, housekeeping, doing laundry, using] : Fully functional and needs no help or supervision to perform IADLs (using the telephone, shopping, preparing meals, housekeeping, doing laundry, using transportation, managing medications and managing finances) [HIV Test offered] : HIV Test offered [Hepatitis C test offered] : Hepatitis C test offered [FreeTextEntry1] : fatigue, increased urination and thirst [de-identified] : hookah 2-3 x weekly [de-identified] : work in [employer] and occupation is physically strenuous [de-identified] : no pork, veggies, bread, rice, no fried foods, would eat fast foods or home cooked meals 50/50 [[id number]] : 0 [High Risk Behavior] : no high risk behavior [HIVDate] : 10/16 [HIVComments] : neg [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

## 2023-01-27 NOTE — PHYSICAL EXAM
[No Edema] : there was no peripheral edema [Normal] : affect was normal and insight and judgment were intact [Normal Supraclavicular Nodes] : no supraclavicular lymphadenopathy [Comprehensive Foot Exam Normal] : Right and left foot were examined and both feet are normal. No ulcers in either foot. Toes are normal and with full ROM.  Normal tactile sensation with monofilament testing throughout both feet [de-identified] : moist mucous membranes [de-identified] : protuberant abdomen [de-identified] : +onycomycosis

## 2023-01-27 NOTE — END OF VISIT
[] : Resident [FreeTextEntry3] : Uncontrolled DM - has not taken any meds for couple of months - restart metformin. very short interval f/u as may well need to begin Insulin as well, at least for the short term. If FS under better control at f/u and sx of uncontrolled DM improved, could also consider GLP1 as an alternative, if there are no contraindications in patient/family hx.

## 2023-01-27 NOTE — ASSESSMENT
[FreeTextEntry1] : 47M with T2DM with peripheral neuropathy, HLD who presents for CPE. \par \par # polydipsia\par # nocturnal polyuria\par - likely due to uncontrolled diabetes (A1c >14 on POCT)\par - u/a\par \par # DM2, uncontrolled\par --A1c >14% today, worsened from 8% at the last visit, \par -- urine microalbumin to cr <30\par -- endo referral\par --Patient is taking these medications: metformin 1000 mg bid, patient used to take insulin\par --Ophtho: Last seen >1 year ago, referral given\par --Podiatrist: Last seen >1 year ago, referral given\par --Patient does check feet every day – no wounds/cuts/scrapes, in office diabetes foot exam wnl\par --Patient’s diet consists of: would eat out vs home cooked meals 50/50, home cooked meals mostly consists of bread, rice, veggies\par --Patient’s exercise consists of: works at [employer] physicaly strenuous\par \par #onychomycosis of toenail\par - podiatry referral\par \par # polyneuropathy 2/2 DM2\par - c/w gabapentin at current dose\par \par # hx of hypertriglyceridemia\par - lipid panel\par \par #HCM\par --CBC, CMP, Lipid panel, Vitamin 25-OH D \par --Influenza Vaccine\par --HIV : neg (10/2016) \par -- HCV \par --Colonoscopy referral\par \par RTC 5 weeks for diabetes f/u\par \par d/w Dr. Lee\par \par Donna Campos MD\par Internal Medicine, PGY-2

## 2023-01-31 DIAGNOSIS — Z00.00 ENCOUNTER FOR GENERAL ADULT MEDICAL EXAMINATION WITHOUT ABNORMAL FINDINGS: ICD-10-CM

## 2023-01-31 DIAGNOSIS — R63.1 POLYDIPSIA: ICD-10-CM

## 2023-01-31 DIAGNOSIS — Z83.3 FAMILY HISTORY OF DIABETES MELLITUS: ICD-10-CM

## 2023-01-31 DIAGNOSIS — R35.81 NOCTURNAL POLYURIA: ICD-10-CM

## 2023-01-31 DIAGNOSIS — E11.9 TYPE 2 DIABETES MELLITUS WITHOUT COMPLICATIONS: ICD-10-CM

## 2023-02-22 ENCOUNTER — APPOINTMENT (OUTPATIENT)
Dept: INTERNAL MEDICINE | Facility: CLINIC | Age: 48
End: 2023-02-22
Payer: COMMERCIAL

## 2023-02-22 ENCOUNTER — OUTPATIENT (OUTPATIENT)
Dept: OUTPATIENT SERVICES | Facility: HOSPITAL | Age: 48
LOS: 1 days | End: 2023-02-22
Payer: SELF-PAY

## 2023-02-22 VITALS
WEIGHT: 180 LBS | DIASTOLIC BLOOD PRESSURE: 80 MMHG | OXYGEN SATURATION: 98 % | HEART RATE: 92 BPM | BODY MASS INDEX: 33.13 KG/M2 | SYSTOLIC BLOOD PRESSURE: 118 MMHG | HEIGHT: 62 IN

## 2023-02-22 DIAGNOSIS — E11.42 TYPE 2 DIABETES MELLITUS WITH DIABETIC POLYNEUROPATHY: ICD-10-CM

## 2023-02-22 DIAGNOSIS — E78.5 HYPERLIPIDEMIA, UNSPECIFIED: ICD-10-CM

## 2023-02-22 DIAGNOSIS — I10 ESSENTIAL (PRIMARY) HYPERTENSION: ICD-10-CM

## 2023-02-22 LAB — GLUCOSE BLDC GLUCOMTR-MCNC: 180

## 2023-02-22 PROCEDURE — G0463: CPT | Mod: 25

## 2023-02-22 PROCEDURE — 82948 REAGENT STRIP/BLOOD GLUCOSE: CPT

## 2023-02-22 PROCEDURE — ZZZZZ: CPT | Mod: GC

## 2023-02-22 RX ORDER — DULAGLUTIDE 0.75 MG/.5ML
0.75 INJECTION, SOLUTION SUBCUTANEOUS
Qty: 12 | Refills: 0 | Status: DISCONTINUED | COMMUNITY
Start: 2023-02-22 | End: 2023-02-22

## 2023-02-25 NOTE — HISTORY OF PRESENT ILLNESS
[FreeTextEntry1] : diabetes follow up [de-identified] : 47M with uncontrolled DM2 with peripheral neuropathy, HLD who presents for diabetes follow up. Patient was recently started on basaglar at last visit on 1/24. Since then patient reports adherence to basiglar 16 U and metformin 1000 mg bid as prescribed. However reports that he has been feeling uncharacteristically fatigued, low energy and tired all the time since he has started the insulin. Wants to stop it now because his polyuria and polydipsia is now resolved. Hasn't been able to take his FS because the needle isn't drawing the prick, reports that he is using the lancet properly, has all the equipment, but forget to bring today. Still hasn't been able to schedule endocrine, ophtho or podiatry appt yet. Counseled patient on importance of establishing care. \par \par Patient reports that he has been trying to make dietary changes, reports that he has drastically reduced rice consumption from diet, but does partake in donut or cookie for occasional afternoon snack. Stopped eating bananas because was told it has a lot of sugar as well. Feels that with this change can stop taking the insulin especially since the insulin is making him feel so fatigued.

## 2023-02-25 NOTE — ASSESSMENT
[FreeTextEntry1] : 47M with uncontrolled DM2 (A1c >14 1/2023) with peripheral neuropathy, HLD who presents for diabetes follow up\par \par # DM2, uncontrolled \par --A1c >14% 1/2023 worsened from 8% at the last visit, \par -- urine microalbumin to cr <30 \par -- endo referral, again strongly encouraged patient to make appt today given long wait time for diabetes clinic. patient expressed understanding saying that he will do so today\par --Patient is taking these medications: metformin 1000 mg bid, basaglar 16 U. can consider stopping basaglar in favor of trulicity/glp1 agonist which can also help with weight loss and avoid frequent FS, which patient dislikes. Patient reports that he doesn't want to waste the insulin and will finish the current stockpile he has but afterwards wants to stop taking insulin. Counseled patient that he is likely fatigued because his blood glucose level is now under better control relative to before he started insulin. However FS in office still suboptimal. Also, in order to avoid hypoglycemia episodes and to appropriately titrate the insulin, we would need to get daily FS 30 minutes prior to meals and bedtime. Pharmacy colleagues analyzed patient's technique for obtaining FS, wasn't using lancet properly, was poking finger with needle without lancet machine which was the cause of the difficulty obtaining FS. Counseled patient on appropriate use, pt is now aware of the proper technique and voices understanding of adherence to insulin regimen and FS monitoring. All questions answered, appreciate pharmacy assistance. plan for eventual transition to trulicity once finishes all the insulin pens he currently has\par --Ophtho: Last seen >1 year ago, referral given. patient reports that he will schedule today\par --Podiatrist: Last seen >1 year ago, referral given, also reports that he will schedule today along with opththo and endo\par --Patient does check feet every day – no wounds/cuts/scrapes\par --Patient’s diet consists of: would eat out vs home cooked meals 50/50, home cooked meals mostly consists of bread, veggies. reports that he stopped eating rice but does eat donut for occasional snack. offered referral to dietetics but patient declines at this time reports that he thinks his diet is appropriate. will continue to  at next visit and will suggest nutrition referral again at that time.\par --Patient’s exercise consists of: works at [employer] physicaly strenuous  \par \par # HLD \par -- ASCVD 1.73% \par -- would like to not start statin yet given fatigue from insulin\par \par # peripheral neuropathy \par - c/w gabapentin\par \par RTC 5 weeks for diabetes follow up\par d/w Dr. Wellington

## 2023-03-01 DIAGNOSIS — E11.9 TYPE 2 DIABETES MELLITUS WITHOUT COMPLICATIONS: ICD-10-CM

## 2023-03-29 ENCOUNTER — APPOINTMENT (OUTPATIENT)
Dept: INTERNAL MEDICINE | Facility: CLINIC | Age: 48
End: 2023-03-29

## 2023-08-09 ENCOUNTER — OUTPATIENT (OUTPATIENT)
Dept: OUTPATIENT SERVICES | Facility: HOSPITAL | Age: 48
LOS: 1 days | End: 2023-08-09
Payer: SELF-PAY

## 2023-08-09 ENCOUNTER — APPOINTMENT (OUTPATIENT)
Age: 48
End: 2023-08-09
Payer: COMMERCIAL

## 2023-08-09 ENCOUNTER — RESULT CHARGE (OUTPATIENT)
Age: 48
End: 2023-08-09

## 2023-08-09 VITALS
HEART RATE: 92 BPM | WEIGHT: 190 LBS | SYSTOLIC BLOOD PRESSURE: 112 MMHG | OXYGEN SATURATION: 97 % | BODY MASS INDEX: 34.96 KG/M2 | HEIGHT: 62 IN | DIASTOLIC BLOOD PRESSURE: 80 MMHG

## 2023-08-09 DIAGNOSIS — I10 ESSENTIAL (PRIMARY) HYPERTENSION: ICD-10-CM

## 2023-08-09 LAB
GLUCOSE BLDC GLUCOMTR-MCNC: NORMAL
HBA1C MFR BLD HPLC: 6.1

## 2023-08-09 PROCEDURE — 99214 OFFICE O/P EST MOD 30 MIN: CPT | Mod: GC

## 2023-08-09 PROCEDURE — G0463: CPT

## 2023-08-09 PROCEDURE — 83036 HEMOGLOBIN GLYCOSYLATED A1C: CPT

## 2023-08-09 PROCEDURE — 82985 ASSAY OF GLYCATED PROTEIN: CPT

## 2023-08-09 RX ORDER — INSULIN GLARGINE 100 [IU]/ML
100 INJECTION, SOLUTION SUBCUTANEOUS
Qty: 2 | Refills: 0 | Status: DISCONTINUED | COMMUNITY
Start: 2023-01-24 | End: 2023-08-09

## 2023-08-09 NOTE — HISTORY OF PRESENT ILLNESS
[FreeTextEntry1] : Follow up for Diabetic management [de-identified] : 47 M w/ PMHx uncontrolled DM2 with peripheral neuropathy, HLD, who presents for follow up of diabetes. Patient attests to not taking basiglar for past 2 months, however has been adherent to metformin and gabpentin. In addition, patient attests to improving diet, cutting out certain foods like bread and increasing his dietary share of vegetables.

## 2023-08-09 NOTE — ASSESSMENT
[FreeTextEntry1] : #Diabetes - POCT Finger Stick 117 (fasting) - POCT hemoglobin A1c 6.1 - Sent for repeat A1c and Fructosamine- may alter therapy based on results - Optho referral - Podiatr referral for mycotic nails - Will stop basiglar pending repeat serum labs above

## 2023-08-09 NOTE — PHYSICAL EXAM
[No Acute Distress] : no acute distress [Well Nourished] : well nourished [Normal Sclera/Conjunctiva] : normal sclera/conjunctiva [Normal Outer Ear/Nose] : the outer ears and nose were normal in appearance [Normal Oropharynx] : the oropharynx was normal [No JVD] : no jugular venous distention [No Respiratory Distress] : no respiratory distress  [No Accessory Muscle Use] : no accessory muscle use [Clear to Auscultation] : lungs were clear to auscultation bilaterally [Normal Rate] : normal rate  [Regular Rhythm] : with a regular rhythm [Normal S1, S2] : normal S1 and S2 [No Murmur] : no murmur heard [Soft] : abdomen soft [Non Tender] : non-tender [Non-distended] : non-distended [Normal Bowel Sounds] : normal bowel sounds [No CVA Tenderness] : no CVA  tenderness [No Spinal Tenderness] : no spinal tenderness [No Joint Swelling] : no joint swelling [Grossly Normal Strength/Tone] : grossly normal strength/tone [No Rash] : no rash [Coordination Grossly Intact] : coordination grossly intact [No Focal Deficits] : no focal deficits [Normal Affect] : the affect was normal [Normal Insight/Judgement] : insight and judgment were intact

## 2023-08-09 NOTE — END OF VISIT
[] : Resident [FreeTextEntry3] : In addition to above, a1c wildly fluctuates, suspect a1c >14 was erroneous, Note patient had hx of pancreatitis which led to his DM so he may be more susceptible to insulin deficiency and large fluctuations in blood sugar? Regardless, note sure I trust the POC a1c testing, will repeat w/ serum and fructosamine

## 2023-08-10 LAB
ESTIMATED AVERAGE GLUCOSE: 128 MG/DL
FRUCTOSAMINE SERPL-MCNC: 309 UMOL/L
HBA1C MFR BLD HPLC: 6.1 %

## 2023-08-24 DIAGNOSIS — E11.9 TYPE 2 DIABETES MELLITUS WITHOUT COMPLICATIONS: ICD-10-CM

## 2023-11-29 ENCOUNTER — MED ADMIN CHARGE (OUTPATIENT)
Age: 48
End: 2023-11-29

## 2023-11-29 ENCOUNTER — APPOINTMENT (OUTPATIENT)
Dept: INTERNAL MEDICINE | Facility: CLINIC | Age: 48
End: 2023-11-29
Payer: COMMERCIAL

## 2023-11-29 ENCOUNTER — OUTPATIENT (OUTPATIENT)
Dept: OUTPATIENT SERVICES | Facility: HOSPITAL | Age: 48
LOS: 1 days | End: 2023-11-29
Payer: SELF-PAY

## 2023-11-29 VITALS
HEART RATE: 100 BPM | HEIGHT: 62 IN | OXYGEN SATURATION: 98 % | WEIGHT: 190 LBS | BODY MASS INDEX: 34.96 KG/M2 | SYSTOLIC BLOOD PRESSURE: 130 MMHG | DIASTOLIC BLOOD PRESSURE: 70 MMHG

## 2023-11-29 DIAGNOSIS — Z23 ENCOUNTER FOR IMMUNIZATION: ICD-10-CM

## 2023-11-29 DIAGNOSIS — I10 ESSENTIAL (PRIMARY) HYPERTENSION: ICD-10-CM

## 2023-11-29 LAB — HBA1C MFR BLD HPLC: 7.3

## 2023-11-29 PROCEDURE — 83036 HEMOGLOBIN GLYCOSYLATED A1C: CPT

## 2023-11-29 PROCEDURE — G0463: CPT

## 2023-11-29 PROCEDURE — 99213 OFFICE O/P EST LOW 20 MIN: CPT | Mod: GE

## 2023-11-29 PROCEDURE — G0008: CPT

## 2023-11-29 PROCEDURE — 82043 UR ALBUMIN QUANTITATIVE: CPT

## 2023-11-30 LAB
CREAT SPEC-SCNC: 56 MG/DL
ESTIMATED AVERAGE GLUCOSE: 148 MG/DL
HBA1C MFR BLD HPLC: 6.8 %
MICROALBUMIN 24H UR DL<=1MG/L-MCNC: <1.2 MG/DL
MICROALBUMIN/CREAT 24H UR-RTO: NORMAL MG/G

## 2023-12-13 DIAGNOSIS — E11.9 TYPE 2 DIABETES MELLITUS WITHOUT COMPLICATIONS: ICD-10-CM

## 2023-12-13 DIAGNOSIS — Z23 ENCOUNTER FOR IMMUNIZATION: ICD-10-CM

## 2023-12-13 DIAGNOSIS — E11.42 TYPE 2 DIABETES MELLITUS WITH DIABETIC POLYNEUROPATHY: ICD-10-CM

## 2024-02-07 ENCOUNTER — RX RENEWAL (OUTPATIENT)
Age: 49
End: 2024-02-07

## 2024-02-22 ENCOUNTER — APPOINTMENT (OUTPATIENT)
Dept: INTERNAL MEDICINE | Facility: CLINIC | Age: 49
End: 2024-02-22

## 2024-07-02 ENCOUNTER — APPOINTMENT (OUTPATIENT)
Dept: INTERNAL MEDICINE | Facility: CLINIC | Age: 49
End: 2024-07-02
Payer: COMMERCIAL

## 2024-07-02 ENCOUNTER — OUTPATIENT (OUTPATIENT)
Dept: OUTPATIENT SERVICES | Facility: HOSPITAL | Age: 49
LOS: 1 days | End: 2024-07-02
Payer: SELF-PAY

## 2024-07-02 ENCOUNTER — MED ADMIN CHARGE (OUTPATIENT)
Age: 49
End: 2024-07-02

## 2024-07-02 ENCOUNTER — NON-APPOINTMENT (OUTPATIENT)
Age: 49
End: 2024-07-02

## 2024-07-02 VITALS
DIASTOLIC BLOOD PRESSURE: 80 MMHG | OXYGEN SATURATION: 98 % | HEART RATE: 93 BPM | SYSTOLIC BLOOD PRESSURE: 112 MMHG | HEIGHT: 62 IN | BODY MASS INDEX: 33.31 KG/M2 | WEIGHT: 181 LBS

## 2024-07-02 DIAGNOSIS — E11.42 TYPE 2 DIABETES MELLITUS WITH DIABETIC POLYNEUROPATHY: ICD-10-CM

## 2024-07-02 DIAGNOSIS — Z87.898 PERSONAL HISTORY OF OTHER SPECIFIED CONDITIONS: ICD-10-CM

## 2024-07-02 DIAGNOSIS — E66.9 OBESITY, UNSPECIFIED: ICD-10-CM

## 2024-07-02 DIAGNOSIS — E78.5 HYPERLIPIDEMIA, UNSPECIFIED: ICD-10-CM

## 2024-07-02 DIAGNOSIS — I10 ESSENTIAL (PRIMARY) HYPERTENSION: ICD-10-CM

## 2024-07-02 DIAGNOSIS — Z00.00 ENCOUNTER FOR GENERAL ADULT MEDICAL EXAMINATION W/OUT ABNORMAL FINDINGS: ICD-10-CM

## 2024-07-02 PROCEDURE — 82962 GLUCOSE BLOOD TEST: CPT

## 2024-07-02 PROCEDURE — 81003 URINALYSIS AUTO W/O SCOPE: CPT

## 2024-07-02 PROCEDURE — G0009: CPT

## 2024-07-02 PROCEDURE — G0463: CPT | Mod: 25

## 2024-07-02 PROCEDURE — 90677 PCV20 VACCINE IM: CPT

## 2024-07-02 PROCEDURE — 80053 COMPREHEN METABOLIC PANEL: CPT

## 2024-07-02 PROCEDURE — 90472 IMMUNIZATION ADMIN EACH ADD: CPT

## 2024-07-02 PROCEDURE — 81001 URINALYSIS AUTO W/SCOPE: CPT

## 2024-07-02 PROCEDURE — 85025 COMPLETE CBC W/AUTO DIFF WBC: CPT

## 2024-07-02 PROCEDURE — 83036 HEMOGLOBIN GLYCOSYLATED A1C: CPT

## 2024-07-02 PROCEDURE — 80061 LIPID PANEL: CPT

## 2024-07-02 PROCEDURE — 90715 TDAP VACCINE 7 YRS/> IM: CPT

## 2024-07-02 PROCEDURE — 84443 ASSAY THYROID STIM HORMONE: CPT

## 2024-07-02 PROCEDURE — 87389 HIV-1 AG W/HIV-1&-2 AB AG IA: CPT

## 2024-07-02 PROCEDURE — 99396 PREV VISIT EST AGE 40-64: CPT | Mod: 25,GC

## 2024-07-02 RX ORDER — GABAPENTIN 300 MG/1
300 CAPSULE ORAL 3 TIMES DAILY
Qty: 90 | Refills: 0 | Status: ACTIVE | COMMUNITY
Start: 2024-07-02

## 2024-07-02 RX ORDER — ATORVASTATIN CALCIUM 40 MG/1
40 TABLET, FILM COATED ORAL
Qty: 1 | Refills: 1 | Status: ACTIVE | COMMUNITY
Start: 2024-07-02

## 2024-07-03 LAB
ALBUMIN SERPL ELPH-MCNC: 4.4 G/DL
ALP BLD-CCNC: 154 U/L
ALT SERPL-CCNC: 42 U/L
ANION GAP SERPL CALC-SCNC: 14 MMOL/L
APPEARANCE: CLEAR
AST SERPL-CCNC: 30 U/L
BACTERIA: NEGATIVE /HPF
BASOPHILS # BLD AUTO: 0.05 K/UL
BASOPHILS NFR BLD AUTO: 0.6 %
BILIRUB SERPL-MCNC: 1 MG/DL
BILIRUB UR QL STRIP: NORMAL
BILIRUBIN URINE: NEGATIVE
BLOOD URINE: NEGATIVE
BUN SERPL-MCNC: 15 MG/DL
CALCIUM SERPL-MCNC: 9.5 MG/DL
CAST: 0 /LPF
CHLORIDE SERPL-SCNC: 98 MMOL/L
CHOLEST SERPL-MCNC: 133 MG/DL
CO2 SERPL-SCNC: 21 MMOL/L
COLOR: YELLOW
CREAT SERPL-MCNC: 0.52 MG/DL
EGFR: 125 ML/MIN/1.73M2
EOSINOPHIL # BLD AUTO: 0.17 K/UL
EOSINOPHIL NFR BLD AUTO: 1.9 %
EPITHELIAL CELLS: 0 /HPF
GLUCOSE BLDC GLUCOMTR-MCNC: NORMAL
GLUCOSE QUALITATIVE U: >=1000 MG/DL
GLUCOSE SERPL-MCNC: 327 MG/DL
GLUCOSE UR-MCNC: NORMAL
HCG UR QL: 4 EU/DL
HCT VFR BLD CALC: 47.4 %
HDLC SERPL-MCNC: 34 MG/DL
HGB BLD-MCNC: 16.3 G/DL
HGB UR QL STRIP.AUTO: NORMAL
HIV1+2 AB SPEC QL IA.RAPID: NONREACTIVE
IMM GRANULOCYTES NFR BLD AUTO: 1 %
KETONES UR-MCNC: NORMAL
KETONES URINE: NEGATIVE MG/DL
LDLC SERPL CALC-MCNC: 68 MG/DL
LEUKOCYTE ESTERASE UR QL STRIP: NORMAL
LEUKOCYTE ESTERASE URINE: NEGATIVE
LYMPHOCYTES # BLD AUTO: 1.87 K/UL
LYMPHOCYTES NFR BLD AUTO: 20.6 %
MAN DIFF?: NORMAL
MCHC RBC-ENTMCNC: 31.3 PG
MCHC RBC-ENTMCNC: 34.4 GM/DL
MCV RBC AUTO: 91.2 FL
MICROSCOPIC-UA: NORMAL
MONOCYTES # BLD AUTO: 0.57 K/UL
MONOCYTES NFR BLD AUTO: 6.3 %
NEUTROPHILS # BLD AUTO: 6.31 K/UL
NEUTROPHILS NFR BLD AUTO: 69.6 %
NITRITE UR QL STRIP: NORMAL
NITRITE URINE: NEGATIVE
NONHDLC SERPL-MCNC: 99 MG/DL
PH UR STRIP: 6.5
PH URINE: 6.5
PLATELET # BLD AUTO: 212 K/UL
POTASSIUM SERPL-SCNC: 3.9 MMOL/L
PROT SERPL-MCNC: 7.7 G/DL
PROT UR STRIP-MCNC: NORMAL
PROTEIN URINE: NEGATIVE MG/DL
RBC # BLD: 5.2 M/UL
RBC # FLD: 16.1 %
RED BLOOD CELLS URINE: 1 /HPF
SODIUM SERPL-SCNC: 133 MMOL/L
SP GR UR STRIP: 1.01
SPECIFIC GRAVITY URINE: >1.03
TRIGL SERPL-MCNC: 181 MG/DL
TSH SERPL-ACNC: 0.77 UIU/ML
UROBILINOGEN URINE: 2 MG/DL
WBC # FLD AUTO: 9.06 K/UL
WHITE BLOOD CELLS URINE: 3 /HPF

## 2024-07-03 RX ORDER — METFORMIN HYDROCHLORIDE 1000 MG/1
1000 TABLET, COATED ORAL TWICE DAILY
Qty: 14 | Refills: 0 | Status: COMPLETED | COMMUNITY
Start: 2024-07-02 | End: 2024-07-03

## 2024-07-05 ENCOUNTER — NON-APPOINTMENT (OUTPATIENT)
Age: 49
End: 2024-07-05

## 2024-07-05 LAB
ESTIMATED AVERAGE GLUCOSE: 240 MG/DL
HBA1C MFR BLD HPLC: 10 %

## 2024-07-10 ENCOUNTER — OUTPATIENT (OUTPATIENT)
Dept: OUTPATIENT SERVICES | Facility: HOSPITAL | Age: 49
LOS: 1 days | End: 2024-07-10
Payer: SELF-PAY

## 2024-07-10 ENCOUNTER — APPOINTMENT (OUTPATIENT)
Dept: INTERNAL MEDICINE | Facility: CLINIC | Age: 49
End: 2024-07-10
Payer: COMMERCIAL

## 2024-07-10 VITALS
HEART RATE: 81 BPM | DIASTOLIC BLOOD PRESSURE: 70 MMHG | HEIGHT: 62 IN | WEIGHT: 179 LBS | SYSTOLIC BLOOD PRESSURE: 120 MMHG | BODY MASS INDEX: 32.94 KG/M2 | OXYGEN SATURATION: 97 %

## 2024-07-10 DIAGNOSIS — I10 ESSENTIAL (PRIMARY) HYPERTENSION: ICD-10-CM

## 2024-07-10 DIAGNOSIS — E11.9 TYPE 2 DIABETES MELLITUS W/OUT COMPLICATIONS: ICD-10-CM

## 2024-07-10 PROCEDURE — 90715 TDAP VACCINE 7 YRS/> IM: CPT

## 2024-07-10 PROCEDURE — G0009: CPT

## 2024-07-10 PROCEDURE — 90472 IMMUNIZATION ADMIN EACH ADD: CPT

## 2024-07-10 PROCEDURE — 82962 GLUCOSE BLOOD TEST: CPT

## 2024-07-10 PROCEDURE — 81003 URINALYSIS AUTO W/O SCOPE: CPT

## 2024-07-10 PROCEDURE — 99213 OFFICE O/P EST LOW 20 MIN: CPT | Mod: GC

## 2024-07-10 PROCEDURE — G0463: CPT

## 2024-07-10 PROCEDURE — 90677 PCV20 VACCINE IM: CPT

## 2024-07-10 RX ORDER — BLOOD-GLUCOSE METER
W/DEVICE EACH MISCELLANEOUS
Qty: 1 | Refills: 0 | Status: ACTIVE | COMMUNITY
Start: 2024-07-10 | End: 1900-01-01

## 2024-07-10 RX ORDER — BLOOD SUGAR DIAGNOSTIC
STRIP MISCELLANEOUS
Qty: 1 | Refills: 3 | Status: ACTIVE | COMMUNITY
Start: 2024-07-10 | End: 1900-01-01

## 2024-07-10 RX ORDER — INSULIN GLARGINE 100 [IU]/ML
100 INJECTION, SOLUTION SUBCUTANEOUS
Qty: 1 | Refills: 1 | Status: ACTIVE | COMMUNITY
Start: 2024-07-10

## 2024-07-16 DIAGNOSIS — Z23 ENCOUNTER FOR IMMUNIZATION: ICD-10-CM

## 2024-07-16 DIAGNOSIS — E66.9 OBESITY, UNSPECIFIED: ICD-10-CM

## 2024-07-16 DIAGNOSIS — E78.5 HYPERLIPIDEMIA, UNSPECIFIED: ICD-10-CM

## 2024-07-16 DIAGNOSIS — E11.42 TYPE 2 DIABETES MELLITUS WITH DIABETIC POLYNEUROPATHY: ICD-10-CM

## 2024-07-16 DIAGNOSIS — Z00.00 ENCOUNTER FOR GENERAL ADULT MEDICAL EXAMINATION WITHOUT ABNORMAL FINDINGS: ICD-10-CM

## 2024-07-16 DIAGNOSIS — E11.9 TYPE 2 DIABETES MELLITUS WITHOUT COMPLICATIONS: ICD-10-CM

## 2024-07-17 DIAGNOSIS — E11.42 TYPE 2 DIABETES MELLITUS WITH DIABETIC POLYNEUROPATHY: ICD-10-CM

## 2024-07-17 DIAGNOSIS — E66.9 OBESITY, UNSPECIFIED: ICD-10-CM

## 2024-07-17 DIAGNOSIS — E11.9 TYPE 2 DIABETES MELLITUS WITHOUT COMPLICATIONS: ICD-10-CM

## 2024-07-17 DIAGNOSIS — Z23 ENCOUNTER FOR IMMUNIZATION: ICD-10-CM

## 2024-07-17 DIAGNOSIS — Z00.00 ENCOUNTER FOR GENERAL ADULT MEDICAL EXAMINATION WITHOUT ABNORMAL FINDINGS: ICD-10-CM

## 2024-07-17 DIAGNOSIS — E78.5 HYPERLIPIDEMIA, UNSPECIFIED: ICD-10-CM

## 2024-08-07 ENCOUNTER — APPOINTMENT (OUTPATIENT)
Dept: INTERNAL MEDICINE | Facility: CLINIC | Age: 49
End: 2024-08-07

## 2024-08-07 ENCOUNTER — OUTPATIENT (OUTPATIENT)
Dept: OUTPATIENT SERVICES | Facility: HOSPITAL | Age: 49
LOS: 1 days | End: 2024-08-07
Payer: SELF-PAY

## 2024-08-07 PROCEDURE — 82043 UR ALBUMIN QUANTITATIVE: CPT

## 2024-08-07 PROCEDURE — 99214 OFFICE O/P EST MOD 30 MIN: CPT | Mod: GC

## 2024-08-07 PROCEDURE — G0463: CPT

## 2024-08-08 DIAGNOSIS — I10 ESSENTIAL (PRIMARY) HYPERTENSION: ICD-10-CM

## 2024-08-11 NOTE — HISTORY OF PRESENT ILLNESS
[FreeTextEntry1] : f/u [de-identified] : 49M PMH DM2 poorly controlled A1C 10 7/2024, peripheral neuropathy, obesity, presented for f/u #DM pt is taking metformin 1000 mg BID and would like a refill he tried to schedule an ophthalmology appointment but has no insurance and his sliding scale plan was not accepted Pt was not able to get his basaglar pen from the pharmacy Pt doesn't have a glucometer at home and doesn't check his FS He is interested in CGM  #peripheral neuropathy pt is taking gabapentin 300 mg TID and needs refill  #obesity Pt has a scheduled appt with nutritionist for 10/2024 He is interested in Ozempic for weight loss and DM control

## 2024-08-11 NOTE — END OF VISIT
[FreeTextEntry3] : Lack of insurance impacting DM care as he is having issues affording medication and monitoring supplies. will try for Medicaid and also to fill out paperwork for [facility] for help affording medication, especially if is not able to get Medicaid insurance.  [] : Resident

## 2024-08-11 NOTE — ASSESSMENT
[FreeTextEntry1] : 49M PMH DM2 poorly controlled A1C 10 7/2024, peripheral neuropathy, obesity, presented for f/u  HCM Pt needs screening for colonoscopy, currently no insurance. Fecal occult immunology test given to pt. Pt will also apply for insurance, contact information was given.  RTC5w Next visit #DM- check medication compliance metformin, insulin, rosuvastatin, f/u endo, podiatry, ophthal, if pt has insurance consider jardiance 10 mg if no retinopathy by ophthal #obesity- check weight, f/u nutritionist, if pt has insurance consider ozempic  if no retinopathy by ophthal #HCM- f/u FOBT check if pt was able to apply for insurance

## 2024-08-11 NOTE — HISTORY OF PRESENT ILLNESS
[FreeTextEntry1] : f/u [de-identified] : 49M PMH DM2 poorly controlled A1C 10 7/2024, peripheral neuropathy, obesity, presented for f/u #DM pt is taking metformin 1000 mg BID and would like a refill he tried to schedule an ophthalmology appointment but has no insurance and his sliding scale plan was not accepted Pt was not able to get his basaglar pen from the pharmacy Pt doesn't have a glucometer at home and doesn't check his FS He is interested in CGM  #peripheral neuropathy pt is taking gabapentin 300 mg TID and needs refill  #obesity Pt has a scheduled appt with nutritionist for 10/2024 He is interested in Ozempic for weight loss and DM control

## 2024-08-19 DIAGNOSIS — E78.5 HYPERLIPIDEMIA, UNSPECIFIED: ICD-10-CM

## 2024-08-19 DIAGNOSIS — E11.42 TYPE 2 DIABETES MELLITUS WITH DIABETIC POLYNEUROPATHY: ICD-10-CM

## 2024-08-19 DIAGNOSIS — E66.9 OBESITY, UNSPECIFIED: ICD-10-CM

## 2024-08-19 DIAGNOSIS — E11.9 TYPE 2 DIABETES MELLITUS WITHOUT COMPLICATIONS: ICD-10-CM

## 2024-08-20 ENCOUNTER — APPOINTMENT (OUTPATIENT)
Dept: INTERNAL MEDICINE | Facility: CLINIC | Age: 49
End: 2024-08-20

## 2024-09-10 ENCOUNTER — APPOINTMENT (OUTPATIENT)
Dept: INTERNAL MEDICINE | Facility: CLINIC | Age: 49
End: 2024-09-10

## 2024-09-11 ENCOUNTER — APPOINTMENT (OUTPATIENT)
Dept: INTERNAL MEDICINE | Facility: CLINIC | Age: 49
End: 2024-09-11
Payer: COMMERCIAL

## 2024-09-11 ENCOUNTER — MED ADMIN CHARGE (OUTPATIENT)
Age: 49
End: 2024-09-11

## 2024-09-11 ENCOUNTER — OUTPATIENT (OUTPATIENT)
Dept: OUTPATIENT SERVICES | Facility: HOSPITAL | Age: 49
LOS: 1 days | End: 2024-09-11
Payer: SELF-PAY

## 2024-09-11 VITALS
HEIGHT: 62 IN | WEIGHT: 185 LBS | HEART RATE: 97 BPM | SYSTOLIC BLOOD PRESSURE: 110 MMHG | BODY MASS INDEX: 34.04 KG/M2 | OXYGEN SATURATION: 98 % | DIASTOLIC BLOOD PRESSURE: 70 MMHG

## 2024-09-11 DIAGNOSIS — E11.9 TYPE 2 DIABETES MELLITUS W/OUT COMPLICATIONS: ICD-10-CM

## 2024-09-11 DIAGNOSIS — E78.5 HYPERLIPIDEMIA, UNSPECIFIED: ICD-10-CM

## 2024-09-11 DIAGNOSIS — I10 ESSENTIAL (PRIMARY) HYPERTENSION: ICD-10-CM

## 2024-09-11 DIAGNOSIS — E66.9 OBESITY, UNSPECIFIED: ICD-10-CM

## 2024-09-11 DIAGNOSIS — Z00.00 ENCOUNTER FOR GENERAL ADULT MEDICAL EXAMINATION W/OUT ABNORMAL FINDINGS: ICD-10-CM

## 2024-09-11 PROCEDURE — G0463: CPT

## 2024-09-11 PROCEDURE — 99213 OFFICE O/P EST LOW 20 MIN: CPT | Mod: GE

## 2024-09-11 PROCEDURE — G0008: CPT

## 2024-09-11 PROCEDURE — 90656 IIV3 VACC NO PRSV 0.5 ML IM: CPT

## 2024-09-11 RX ORDER — DULAGLUTIDE 0.75 MG/.5ML
0.75 INJECTION, SOLUTION SUBCUTANEOUS WEEKLY
Qty: 4 | Refills: 3 | Status: ACTIVE | COMMUNITY
Start: 2024-09-11

## 2024-09-11 RX ORDER — RIBOFLAVIN (VITAMIN B2) 100 MG
100 TABLET ORAL DAILY
Qty: 30 | Refills: 2 | Status: ACTIVE | COMMUNITY
Start: 2024-09-11

## 2024-09-11 RX ORDER — ROSUVASTATIN CALCIUM 10 MG/1
10 TABLET, FILM COATED ORAL
Qty: 30 | Refills: 2 | Status: ACTIVE | COMMUNITY
Start: 2024-09-11

## 2024-09-11 RX ORDER — METFORMIN HYDROCHLORIDE 1000 MG/1
1000 TABLET, COATED ORAL
Qty: 60 | Refills: 3 | Status: ACTIVE | COMMUNITY
Start: 2024-09-11

## 2024-09-16 NOTE — HISTORY OF PRESENT ILLNESS
[de-identified] : 49M PMH DM poorly controlled, HLD, peripheral neuropathy, obesity BMI 33, presented for f/u Pt has no insurance. #DM- pt takes metformin 1000 mg BId. pt requested refills.  #HLD- did not receive his rosuvastatin #neuropathy- takes gabapentin 300 TID, sxs are controlled #obesity- pt reports he eliminated bread, rice, potatoes and past. pt prefers to speak to nutritionist about weight loss.  #colon ca screening- pt did not understand instruction slast time about FOBT

## 2024-09-16 NOTE — ASSESSMENT
[FreeTextEntry1] : 49M PMH DM poorly controlled, HLD, peripheral neuropathy, obesity BMI 33, presented for f/u  #Pt has no insurance -phone number for hospital Medicaid office was given. phone number for marketplace was given. pt is enrolled in [facility].   #DM- pt takes metformin 1000 mg BId. Free style clint 2 was recommended, along w/price check. referral given for ophthalmology, endocrinology. Consider Jardiance if no retinopathy. Will start Trulicity for DM2 and weight loss. A1c 10. Will up titrate Trulicity to higher dose as tolerated. Pt declined hx/fam hx: gastroparesis, medullary thyroid ca, pancreatitis, pancreatic ca.   #HLD- did not receive his rosuvastatin. ASCVD 8.3. will send for rosuvastatin 10 mg  #neuropathy- takes gabapentin 300 TID, sxs are controlled. f/u podiatry  #obesity- pt reports he eliminated bread, rice, potatoes and past. pt prefers to speak to nutritionist about weight loss. f/u nutritionist referral. Pt was counseled about exercising, calorie deficient CCD and portion control. will start Trulicity 0.75 weekly dose. Pt declined hx/fam hx: gastroparesis, medullary thyroid ca, pancreatitis, pancreatic ca.   #colon ca screening- pt did not understand instruction last time about FOBT. instructions given this visit. pt shows understanding.  # HCM -covid vaccine- declined -flu- ordered -PCV 20 -7/2024 -Tdap 2024 A1c 10  RTC in 5 w f/u Trulicity tolerance, consider increasing the dose f/u glucometer -f/u ophthalmology, podiatry, nutrition -check weight -f/u FOBT

## 2024-09-16 NOTE — HISTORY OF PRESENT ILLNESS
[de-identified] : 49M PMH DM poorly controlled, HLD, peripheral neuropathy, obesity BMI 33, presented for f/u Pt has no insurance. #DM- pt takes metformin 1000 mg BId. pt requested refills.  #HLD- did not receive his rosuvastatin #neuropathy- takes gabapentin 300 TID, sxs are controlled #obesity- pt reports he eliminated bread, rice, potatoes and past. pt prefers to speak to nutritionist about weight loss.  #colon ca screening- pt did not understand instruction slast time about FOBT

## 2024-09-16 NOTE — ASSESSMENT
[FreeTextEntry1] : 49M PMH DM poorly controlled, HLD, peripheral neuropathy, obesity BMI 33, presented for f/u  #Pt has no insurance -phone number for hospital Medicaid office was given. phone number for marketplace was given. pt is enrolled in [facility].   #DM- pt takes metformin 1000 mg BId. Free style clint 2 was recommended, along w/price check. referral given for ophthalmology, endocrinology. Consider Jardiance if no retinopathy. Will start Trulicity for DM2 and weight loss. A1c 10. Will up titrate Trulicity to higher dose as tolerated. Pt declined hx/fam hx: gastroparesis, medullary thyroid ca, pancreatitis, pancreatic ca.   #HLD- did not receive his rosuvastatin. ASCVD 8.3. will send for rosuvastatin 10 mg  #neuropathy- takes gabapentin 300 TID, sxs are controlled. f/u podiatry  #obesity- pt reports he eliminated bread, rice, potatoes and past. pt prefers to speak to nutritionist about weight loss. f/u nutritionist referral. Pt was counseled about exercising, calorie deficient CCD and portion control. will start Trulicity 0.75 weekly dose. Pt declined hx/fam hx: gastroparesis, medullary thyroid ca, pancreatitis, pancreatic ca.   #colon ca screening- pt did not understand instruction last time about FOBT. instructions given this visit. pt shows understanding.  # HCM -covid vaccine- declined -flu- ordered -PCV 20 -7/2024 -Tdap 2024 A1c 10  RTC in 5 w f/u Trulicity tolerance, consider increasing the dose f/u glucometer -f/u ophthalmology, podiatry, nutrition -check weight -f/u FOBT

## 2024-09-24 DIAGNOSIS — Z23 ENCOUNTER FOR IMMUNIZATION: ICD-10-CM

## 2024-09-24 DIAGNOSIS — E78.5 HYPERLIPIDEMIA, UNSPECIFIED: ICD-10-CM

## 2024-09-24 DIAGNOSIS — E11.9 TYPE 2 DIABETES MELLITUS WITHOUT COMPLICATIONS: ICD-10-CM

## 2024-09-24 DIAGNOSIS — E66.9 OBESITY, UNSPECIFIED: ICD-10-CM

## 2024-09-24 DIAGNOSIS — Z00.00 ENCOUNTER FOR GENERAL ADULT MEDICAL EXAMINATION WITHOUT ABNORMAL FINDINGS: ICD-10-CM

## 2024-10-17 ENCOUNTER — APPOINTMENT (OUTPATIENT)
Dept: INTERNAL MEDICINE | Facility: CLINIC | Age: 49
End: 2024-10-17

## 2024-10-17 ENCOUNTER — OUTPATIENT (OUTPATIENT)
Dept: OUTPATIENT SERVICES | Facility: HOSPITAL | Age: 49
LOS: 1 days | End: 2024-10-17
Payer: SELF-PAY

## 2024-10-17 VITALS
HEIGHT: 62 IN | SYSTOLIC BLOOD PRESSURE: 112 MMHG | WEIGHT: 185 LBS | HEART RATE: 98 BPM | DIASTOLIC BLOOD PRESSURE: 70 MMHG | BODY MASS INDEX: 34.04 KG/M2 | OXYGEN SATURATION: 98 %

## 2024-10-17 DIAGNOSIS — Z12.11 ENCOUNTER FOR SCREENING FOR MALIGNANT NEOPLASM OF COLON: ICD-10-CM

## 2024-10-17 DIAGNOSIS — E11.42 TYPE 2 DIABETES MELLITUS WITH DIABETIC POLYNEUROPATHY: ICD-10-CM

## 2024-10-17 DIAGNOSIS — I10 ESSENTIAL (PRIMARY) HYPERTENSION: ICD-10-CM

## 2024-10-17 DIAGNOSIS — E66.811 OBESITY, CLASS 1: ICD-10-CM

## 2024-10-17 DIAGNOSIS — E11.9 TYPE 2 DIABETES MELLITUS WITHOUT COMPLICATIONS: ICD-10-CM

## 2024-10-17 DIAGNOSIS — E11.9 TYPE 2 DIABETES MELLITUS W/OUT COMPLICATIONS: ICD-10-CM

## 2024-10-17 DIAGNOSIS — E78.5 HYPERLIPIDEMIA, UNSPECIFIED: ICD-10-CM

## 2024-10-17 DIAGNOSIS — Z00.00 ENCOUNTER FOR GENERAL ADULT MEDICAL EXAMINATION W/OUT ABNORMAL FINDINGS: ICD-10-CM

## 2024-10-17 PROCEDURE — G2211 COMPLEX E/M VISIT ADD ON: CPT | Mod: GE

## 2024-10-17 PROCEDURE — 99213 OFFICE O/P EST LOW 20 MIN: CPT | Mod: GE

## 2024-10-17 PROCEDURE — 83036 HEMOGLOBIN GLYCOSYLATED A1C: CPT

## 2024-10-17 PROCEDURE — G0463: CPT

## 2024-10-17 RX ORDER — GABAPENTIN 300 MG/1
300 TABLET ORAL 3 TIMES DAILY
Qty: 90 | Refills: 2 | Status: ACTIVE | COMMUNITY
Start: 2024-10-17

## 2024-10-17 RX ORDER — DULAGLUTIDE 0.75 MG/.5ML
0.75 INJECTION, SOLUTION SUBCUTANEOUS WEEKLY
Qty: 4 | Refills: 3 | Status: ACTIVE | COMMUNITY
Start: 2024-10-17

## 2024-10-17 RX ORDER — ATORVASTATIN CALCIUM 20 MG/1
20 TABLET, FILM COATED ORAL
Qty: 90 | Refills: 3 | Status: ACTIVE | COMMUNITY
Start: 2024-10-17

## 2024-10-18 LAB
ESTIMATED AVERAGE GLUCOSE: 151 MG/DL
HBA1C MFR BLD HPLC: 6.9 %
HBA1C MFR BLD HPLC: 7.1

## 2025-03-19 NOTE — END OF VISIT
Blood pressure stable. Continue current medications.   [] : Resident [FreeTextEntry3] : Combination of lifestyle modifications and metformin led to weight loss. Will need repeat A1C check soon to assess how well controlled DM.

## 2025-06-25 ENCOUNTER — RX RENEWAL (OUTPATIENT)
Age: 50
End: 2025-06-25